# Patient Record
Sex: FEMALE | Race: WHITE | NOT HISPANIC OR LATINO | Employment: UNEMPLOYED | URBAN - METROPOLITAN AREA
[De-identification: names, ages, dates, MRNs, and addresses within clinical notes are randomized per-mention and may not be internally consistent; named-entity substitution may affect disease eponyms.]

---

## 2019-12-09 ENCOUNTER — TELEPHONE (OUTPATIENT)
Dept: SCHEDULING | Facility: IMAGING CENTER | Age: 54
End: 2019-12-09

## 2019-12-10 ENCOUNTER — OFFICE VISIT (OUTPATIENT)
Dept: MEDICAL GROUP | Facility: PHYSICIAN GROUP | Age: 54
End: 2019-12-10
Payer: COMMERCIAL

## 2019-12-10 VITALS
SYSTOLIC BLOOD PRESSURE: 132 MMHG | RESPIRATION RATE: 14 BRPM | BODY MASS INDEX: 26.5 KG/M2 | DIASTOLIC BLOOD PRESSURE: 82 MMHG | TEMPERATURE: 98.7 F | HEIGHT: 62 IN | OXYGEN SATURATION: 98 % | WEIGHT: 144 LBS | HEART RATE: 82 BPM

## 2019-12-10 DIAGNOSIS — M54.40 CHRONIC LOW BACK PAIN WITH SCIATICA, SCIATICA LATERALITY UNSPECIFIED, UNSPECIFIED BACK PAIN LATERALITY: ICD-10-CM

## 2019-12-10 DIAGNOSIS — F41.9 ANXIETY: ICD-10-CM
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
## 2019-12-10 DIAGNOSIS — F33.0 MDD (MAJOR DEPRESSIVE DISORDER), RECURRENT EPISODE, MILD (HCC): ICD-10-CM

## 2019-12-10 DIAGNOSIS — Z87.2 HISTORY OF CELLULITIS: ICD-10-CM

## 2019-12-10 DIAGNOSIS — Z09 HOSPITAL DISCHARGE FOLLOW-UP: ICD-10-CM

## 2019-12-10 DIAGNOSIS — G89.29 CHRONIC LOW BACK PAIN WITH SCIATICA, SCIATICA LATERALITY UNSPECIFIED, UNSPECIFIED BACK PAIN LATERALITY: ICD-10-CM

## 2019-12-10 PROCEDURE — 99358 PROLONG SERVICE W/O CONTACT: CPT | Performed by: FAMILY MEDICINE

## 2019-12-10 PROCEDURE — 99204 OFFICE O/P NEW MOD 45 MIN: CPT | Mod: 25 | Performed by: FAMILY MEDICINE

## 2019-12-10 RX ORDER — TRAMADOL HYDROCHLORIDE 50 MG/1
50 TABLET ORAL EVERY 8 HOURS PRN
COMMUNITY

## 2019-12-10 RX ORDER — CLONAZEPAM 1 MG/1
0.5 TABLET ORAL 2 TIMES DAILY
Qty: 60 TAB | Refills: 0
Start: 2019-12-10 | End: 2020-01-09

## 2019-12-10 RX ORDER — HYDROCODONE BITARTRATE AND ACETAMINOPHEN 5; 325 MG/1; MG/1
1-2 TABLET ORAL EVERY 4 HOURS PRN
COMMUNITY

## 2019-12-10 RX ORDER — SERTRALINE HYDROCHLORIDE 100 MG/1
200 TABLET, FILM COATED ORAL DAILY
COMMUNITY

## 2019-12-10 RX ORDER — CLINDAMYCIN HYDROCHLORIDE 300 MG/1
600 CAPSULE ORAL 3 TIMES DAILY
Qty: 42 CAP | Refills: 0 | Status: SHIPPED | OUTPATIENT
Start: 2019-12-10

## 2019-12-10 RX ORDER — SIMVASTATIN 80 MG
80 TABLET ORAL NIGHTLY
COMMUNITY

## 2019-12-10 RX ORDER — CYCLOBENZAPRINE HCL 10 MG
10 TABLET ORAL 2 TIMES DAILY
COMMUNITY

## 2019-12-10 RX ORDER — CLINDAMYCIN HYDROCHLORIDE 300 MG/1
300 CAPSULE ORAL 3 TIMES DAILY
COMMUNITY
End: 2019-12-10 | Stop reason: SDUPTHER

## 2019-12-10 ASSESSMENT — PATIENT HEALTH QUESTIONNAIRE - PHQ9: CLINICAL INTERPRETATION OF PHQ2 SCORE: 0

## 2019-12-11 PROBLEM — F41.9 ANXIETY: Status: ACTIVE | Noted: 2019-12-11

## 2019-12-11 PROBLEM — G89.29 CHRONIC LOW BACK PAIN WITH SCIATICA: Status: ACTIVE | Noted: 2019-12-11

## 2019-12-11 PROBLEM — F33.0 MDD (MAJOR DEPRESSIVE DISORDER), RECURRENT EPISODE, MILD (HCC): Status: ACTIVE | Noted: 2019-12-11

## 2019-12-11 PROBLEM — M54.40 CHRONIC LOW BACK PAIN WITH SCIATICA: Status: ACTIVE | Noted: 2019-12-11

## 2019-12-11 ASSESSMENT — ENCOUNTER SYMPTOMS
DOUBLE VISION: 0
DEPRESSION: 1
CHILLS: 0
NAUSEA: 0
PALPITATIONS: 0
HEADACHES: 0
CONSTITUTIONAL NEGATIVE: 1
DIZZINESS: 0
GASTROINTESTINAL NEGATIVE: 1
MYALGIAS: 0
RESPIRATORY NEGATIVE: 1
HEARTBURN: 0
HEMOPTYSIS: 0
EYES NEGATIVE: 1
NEUROLOGICAL NEGATIVE: 1
BRUISES/BLEEDS EASILY: 0
COUGH: 0
BACK PAIN: 1
BLURRED VISION: 0
CARDIOVASCULAR NEGATIVE: 1
NERVOUS/ANXIOUS: 1
TINGLING: 0
FEVER: 0

## 2019-12-11 NOTE — PROGRESS NOTES
Subjective:      Tracy Muñoz is a 53 y.o. female who presents with Hospital Follow-up (x 1 week / Diabetes / RT hand Cat bite / est. / swollen / redness)            In addition to the time for this e/m visit, another 35 minutes were spent in case management either reviewing old records or arranging for this patient's care and so an additional 87970 code is being used  The time spent were from 1800 to 1840      1. Hospital discharge follow-up  Patient visiting her sister here in [place], she lives in [place]   Was bitten by a cat and not improved on po amoxil at home and went to [facility], had iv abx for 3 days with improvement of redness and swelling in left hand, sent home on po cleocin and has continued to improve but still with some redness and swelling in the dorsum, she had an elevation in her wbc in [facility] that declined to normal at time of d/c  Will give another round of cleocin as redness not fully resolved and f/u here in 2 weeks or sooner if not improved    2. History of cellulitis    - clindamycin (CLEOCIN) 300 MG Cap; Take 2 Caps by mouth 3 times a day.  Dispense: 42 Cap; Refill: 0    3. Cat bite, sequela    - clindamycin (CLEOCIN) 300 MG Cap; Take 2 Caps by mouth 3 times a day.  Dispense: 42 Cap; Refill: 0    4. Anxiety  Not rx by me, The patient's current medical issue is well controlled on the current therapy with no new symptoms or worsening    - clonazePAM (KLONOPIN) 1 MG Tab; Take 0.5 Tabs by mouth 2 times a day for 30 days.  Dispense: 60 Tab; Refill: 0    5. MDD (major depressive disorder), recurrent episode, mild (HCC)  The patient's current medical issue is well controlled on the current therapy with no new symptoms or worsening      6. Diabetes mellitus, insulin dependent (IDDM), controlled (HCC)  On insulin regular and levamir, will continue    7. Chronic low back pain with sciatica, sciatica laterality unspecified, unspecified back pain laterality  Per her pain specialist    Past Medical  History:  No date: Depression  No date: Diabetes (HCC)  No date: Hyperlipidemia  Past Surgical History:  No date: ABDOMINAL HYSTERECTOMY TOTAL  No date: CHOLECYSTECTOMY  No date: LAMINOTOMY      Comment:  spinal fusion  Social History    Tobacco Use      Smoking status: Former Smoker        Packs/day: 0.00      Smokeless tobacco: Current User      Tobacco comment: vape    Alcohol use: Yes    Drug use: Not Currently    Review of patient's family history indicates:  Problem: Diabetes      Relation: Sister          Age of Onset: (Not Specified)      Current Outpatient Medications: •  tramadol (ULTRAM) 50 MG Tab, Take 50 mg by mouth every four hours as needed., Disp: , Rfl: •  sertraline (ZOLOFT) 100 MG Tab, Take 100 mg by mouth every day., Disp: , Rfl:  •  cyclobenzaprine (FLEXERIL) 10 MG Tab, Take 10 mg by mouth 3 times a day as needed., Disp: , Rfl: •  simvastatin (ZOCOR) 80 MG tablet, Take 80 mg by mouth every evening., Disp: , Rfl: •  HYDROcodone-acetaminophen (NORCO) 5-325 MG Tab per tablet, Take 1-2 Tabs by mouth every four hours as needed., Disp: , Rfl: •  Fluticasone Furoate 50 MCG/ACT AEROSOL POWDER, BREATH ACTIVATED, Inhale  by mouth., Disp: , Rfl: •  Procaine HCl (NOVOCAIN INJ), by Injection route., Disp: , Rfl: •  clonazePAM (KLONOPIN) 1 MG Tab, Take 0.5 Tabs by mouth 2 times a day for 30 days., Disp: 60 Tab, Rfl: 0•  clindamycin (CLEOCIN) 300 MG Cap, Take 2 Caps by mouth 3 times a day., Disp: 42 Cap, Rfl: 0    Patient was instructed on the use of medications, either prescriptions or OTC and informed on when the appropriate follow up time period should be. In addition, patient was also instructed that should any acute worsening occur that they should notify this clinic asap or call 911.            Review of Systems   Constitutional: Negative.  Negative for chills and fever.   HENT: Negative.  Negative for hearing loss.    Eyes: Negative.  Negative for blurred vision and double vision.   Respiratory:  "Negative.  Negative for cough and hemoptysis.    Cardiovascular: Negative.  Negative for chest pain and palpitations.   Gastrointestinal: Negative.  Negative for heartburn and nausea.   Genitourinary: Negative.  Negative for dysuria.   Musculoskeletal: Positive for back pain. Negative for myalgias.   Skin: Positive for itching and rash.   Neurological: Negative.  Negative for dizziness, tingling and headaches.   Endo/Heme/Allergies: Negative.  Does not bruise/bleed easily.   Psychiatric/Behavioral: Positive for depression. Negative for suicidal ideas. The patient is nervous/anxious.    All other systems reviewed and are negative.         Objective:     /82   Pulse 82   Temp 37.1 °C (98.7 °F) (Temporal)   Resp 14   Ht 1.575 m (5' 2\")   Wt 65.3 kg (144 lb)   SpO2 98%   BMI 26.34 kg/m²      Physical Exam  Vitals signs and nursing note reviewed.   Constitutional:       General: She is not in acute distress.     Appearance: She is well-developed. She is not diaphoretic.   HENT:      Head: Normocephalic and atraumatic.      Mouth/Throat:      Pharynx: No oropharyngeal exudate.   Eyes:      Pupils: Pupils are equal, round, and reactive to light.   Cardiovascular:      Rate and Rhythm: Normal rate and regular rhythm.      Heart sounds: Normal heart sounds. No murmur. No friction rub. No gallop.    Pulmonary:      Effort: Pulmonary effort is normal. No respiratory distress.      Breath sounds: Normal breath sounds. No wheezing or rales.   Chest:      Chest wall: No tenderness.   Musculoskeletal:      Left hand: She exhibits decreased range of motion, tenderness and swelling.        Hands:    Neurological:      Mental Status: She is alert and oriented to person, place, and time.   Psychiatric:         Behavior: Behavior normal.         Thought Content: Thought content normal.         Judgment: Judgment normal.                 Assessment/Plan:       1. Hospital discharge follow-up      2. History of " cellulitis    - clindamycin (CLEOCIN) 300 MG Cap; Take 2 Caps by mouth 3 times a day.  Dispense: 42 Cap; Refill: 0    3. Cat bite, sequela    - clindamycin (CLEOCIN) 300 MG Cap; Take 2 Caps by mouth 3 times a day.  Dispense: 42 Cap; Refill: 0    4. Anxiety    - clonazePAM (KLONOPIN) 1 MG Tab; Take 0.5 Tabs by mouth 2 times a day for 30 days.  Dispense: 60 Tab; Refill: 0    5. MDD (major depressive disorder), recurrent episode, mild (HCC)      6. Diabetes mellitus, insulin dependent (IDDM), controlled (HCC)      7. Chronic low back pain with sciatica, sciatica laterality unspecified, unspecified back pain laterality

## 2019-12-24 ENCOUNTER — OFFICE VISIT (OUTPATIENT)
Dept: MEDICAL GROUP | Facility: PHYSICIAN GROUP | Age: 54
End: 2019-12-24
Payer: COMMERCIAL

## 2019-12-24 VITALS
TEMPERATURE: 97.6 F | BODY MASS INDEX: 27.23 KG/M2 | RESPIRATION RATE: 16 BRPM | HEART RATE: 99 BPM | OXYGEN SATURATION: 90 % | DIASTOLIC BLOOD PRESSURE: 68 MMHG | WEIGHT: 148 LBS | HEIGHT: 62 IN | SYSTOLIC BLOOD PRESSURE: 92 MMHG

## 2019-12-24 DIAGNOSIS — Z87.2 HISTORY OF CELLULITIS: ICD-10-CM

## 2019-12-24 DIAGNOSIS — G89.29 CHRONIC LOW BACK PAIN WITH SCIATICA, SCIATICA LATERALITY UNSPECIFIED, UNSPECIFIED BACK PAIN LATERALITY: ICD-10-CM
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
## 2019-12-24 DIAGNOSIS — M54.40 CHRONIC LOW BACK PAIN WITH SCIATICA, SCIATICA LATERALITY UNSPECIFIED, UNSPECIFIED BACK PAIN LATERALITY: ICD-10-CM

## 2019-12-24 PROCEDURE — 99214 OFFICE O/P EST MOD 30 MIN: CPT | Performed by: FAMILY MEDICINE

## 2019-12-24 ASSESSMENT — ENCOUNTER SYMPTOMS
DOUBLE VISION: 0
CONSTITUTIONAL NEGATIVE: 1
DIZZINESS: 0
HEADACHES: 0
GASTROINTESTINAL NEGATIVE: 1
PSYCHIATRIC NEGATIVE: 1
BLURRED VISION: 0
BRUISES/BLEEDS EASILY: 0
COUGH: 0
DEPRESSION: 0
NAUSEA: 0
CARDIOVASCULAR NEGATIVE: 1
HEARTBURN: 0
MYALGIAS: 0
CHILLS: 0
TINGLING: 0
EYES NEGATIVE: 1
HEMOPTYSIS: 0
NEUROLOGICAL NEGATIVE: 1
PALPITATIONS: 0
FEVER: 0
RESPIRATORY NEGATIVE: 1

## 2019-12-24 NOTE — PROGRESS NOTES
Subjective:      Tracy Muñoz is a 53 y.o. female who presents with Follow-Up (cat bite)            Erythema in the left wrist from cat bite now resolved. Still with some mild swelling in the wrist and MCP and resultant decrease in ROM, advised on exercises and if when she gets back to [place] next week still has this advised to get some PT    1. History of cellulitis      2. Cat bite, sequela      3. Diabetes mellitus, insulin dependent (IDDM), controlled (HCC)  Currently treated for DM, taking meds and checking bs at home, trying to do DM diet.controlled      4. Chronic low back pain with sciatica, sciatica laterality unspecified, unspecified back pain laterality  The patient's current medical issue is well controlled on the current therapy with no new symptoms or worsening      Past Medical History:  No date: Depression  No date: Diabetes (HCC)  No date: Hyperlipidemia  Past Surgical History:  No date: ABDOMINAL HYSTERECTOMY TOTAL  No date: CHOLECYSTECTOMY  No date: LAMINOTOMY      Comment:  spinal fusion  Social History    Tobacco Use      Smoking status: Former Smoker        Packs/day: 0.00      Smokeless tobacco: Current User      Tobacco comment: vape    Alcohol use: Yes    Drug use: Not Currently    Review of patient's family history indicates:  Problem: Diabetes      Relation: Sister          Age of Onset: (Not Specified)      Current Outpatient Medications: •  tramadol (ULTRAM) 50 MG Tab, Take 50 mg by mouth every four hours as needed., Disp: , Rfl: •  sertraline (ZOLOFT) 100 MG Tab, Take 100 mg by mouth every day., Disp: , Rfl:  •  cyclobenzaprine (FLEXERIL) 10 MG Tab, Take 10 mg by mouth 3 times a day as needed., Disp: , Rfl: •  simvastatin (ZOCOR) 80 MG tablet, Take 80 mg by mouth every evening., Disp: , Rfl: •  HYDROcodone-acetaminophen (NORCO) 5-325 MG Tab per tablet, Take 1-2 Tabs by mouth every four hours as needed., Disp: , Rfl: •  Fluticasone Furoate 50 MCG/ACT AEROSOL POWDER, BREATH  "ACTIVATED, Inhale  by mouth., Disp: , Rfl: •  Procaine HCl (NOVOCAIN INJ), by Injection route., Disp: , Rfl: •  clonazePAM (KLONOPIN) 1 MG Tab, Take 0.5 Tabs by mouth 2 times a day for 30 days., Disp: 60 Tab, Rfl: 0•  clindamycin (CLEOCIN) 300 MG Cap, Take 2 Caps by mouth 3 times a day., Disp: 42 Cap, Rfl: 0    Patient was instructed on the use of medications, either prescriptions or OTC and informed on when the appropriate follow up time period should be. In addition, patient was also instructed that should any acute worsening occur that they should notify this clinic asap or call 911.          Review of Systems   Constitutional: Negative.  Negative for chills and fever.   HENT: Negative.  Negative for hearing loss.    Eyes: Negative.  Negative for blurred vision and double vision.   Respiratory: Negative.  Negative for cough and hemoptysis.    Cardiovascular: Negative.  Negative for chest pain and palpitations.   Gastrointestinal: Negative.  Negative for heartburn and nausea.   Genitourinary: Negative.  Negative for dysuria.   Musculoskeletal: Positive for joint pain. Negative for myalgias.   Skin: Positive for itching and rash.   Neurological: Negative.  Negative for dizziness, tingling and headaches.   Endo/Heme/Allergies: Negative.  Does not bruise/bleed easily.   Psychiatric/Behavioral: Negative.  Negative for depression and suicidal ideas.   All other systems reviewed and are negative.         Objective:     BP (!) 92/68 (BP Location: Left arm, Patient Position: Sitting, BP Cuff Size: Adult)   Pulse 99   Temp 36.4 °C (97.6 °F) (Temporal)   Resp 16   Ht 1.575 m (5' 2\")   Wt 67.1 kg (148 lb)   SpO2 90%   BMI 27.07 kg/m²      Physical Exam  Vitals signs and nursing note reviewed.   Constitutional:       General: She is not in acute distress.     Appearance: She is well-developed. She is not diaphoretic.   HENT:      Head: Normocephalic and atraumatic.      Mouth/Throat:      Pharynx: No oropharyngeal " exudate.   Eyes:      Pupils: Pupils are equal, round, and reactive to light.   Cardiovascular:      Rate and Rhythm: Normal rate and regular rhythm.      Heart sounds: Normal heart sounds. No murmur. No friction rub. No gallop.    Pulmonary:      Effort: Pulmonary effort is normal. No respiratory distress.      Breath sounds: Normal breath sounds. No wheezing or rales.   Chest:      Chest wall: No tenderness.   Musculoskeletal:      Left hand: She exhibits decreased range of motion and swelling. She exhibits no tenderness, no bony tenderness, normal two-point discrimination, normal capillary refill, no deformity and no laceration. Normal sensation noted. Normal strength noted.        Hands:    Neurological:      Mental Status: She is alert and oriented to person, place, and time.   Psychiatric:         Behavior: Behavior normal.         Thought Content: Thought content normal.         Judgment: Judgment normal.                 Assessment/Plan:       1. History of cellulitis      2. Cat bite, sequela      3. Diabetes mellitus, insulin dependent (IDDM), controlled (HCC)      4. Chronic low back pain with sciatica, sciatica laterality unspecified, unspecified back pain laterality

## 2020-02-03 ENCOUNTER — OFFICE VISIT (OUTPATIENT)
Dept: MEDICAL GROUP | Facility: PHYSICIAN GROUP | Age: 55
End: 2020-02-03
Payer: COMMERCIAL

## 2020-02-03 VITALS
BODY MASS INDEX: 27.64 KG/M2 | HEIGHT: 62 IN | TEMPERATURE: 97 F | RESPIRATION RATE: 20 BRPM | SYSTOLIC BLOOD PRESSURE: 90 MMHG | OXYGEN SATURATION: 96 % | HEART RATE: 80 BPM | DIASTOLIC BLOOD PRESSURE: 70 MMHG | WEIGHT: 150.2 LBS

## 2020-02-03 DIAGNOSIS — F11.90 OPIATE USE: ICD-10-CM

## 2020-02-03 DIAGNOSIS — G89.29 CHRONIC LOW BACK PAIN WITH SCIATICA, SCIATICA LATERALITY UNSPECIFIED, UNSPECIFIED BACK PAIN LATERALITY: ICD-10-CM

## 2020-02-03 DIAGNOSIS — F41.9 ANXIETY: ICD-10-CM

## 2020-02-03 DIAGNOSIS — M54.40 CHRONIC LOW BACK PAIN WITH SCIATICA, SCIATICA LATERALITY UNSPECIFIED, UNSPECIFIED BACK PAIN LATERALITY: ICD-10-CM

## 2020-02-03 PROCEDURE — 99214 OFFICE O/P EST MOD 30 MIN: CPT | Performed by: FAMILY MEDICINE

## 2020-02-03 RX ORDER — CLONAZEPAM 1 MG/1
1 TABLET ORAL 3 TIMES DAILY
COMMUNITY

## 2020-02-03 RX ORDER — IBUPROFEN 400 MG/1
400 TABLET ORAL 2 TIMES DAILY
COMMUNITY

## 2020-02-03 RX ORDER — GABAPENTIN 300 MG/1
300 CAPSULE ORAL 3 TIMES DAILY
Qty: 90 CAP | Refills: 1 | Status: SHIPPED | OUTPATIENT
Start: 2020-02-03

## 2020-02-03 RX ORDER — LISINOPRIL 5 MG/1
5 TABLET ORAL DAILY
COMMUNITY

## 2020-02-03 ASSESSMENT — ENCOUNTER SYMPTOMS
PALPITATIONS: 0
PSYCHIATRIC NEGATIVE: 1
EYES NEGATIVE: 1
GASTROINTESTINAL NEGATIVE: 1
HEMOPTYSIS: 0
DEPRESSION: 0
NAUSEA: 0
CHILLS: 0
DOUBLE VISION: 0
HEADACHES: 0
DIZZINESS: 0
TINGLING: 0
HEARTBURN: 0
BACK PAIN: 1
RESPIRATORY NEGATIVE: 1
CONSTITUTIONAL NEGATIVE: 1
FEVER: 0
NEUROLOGICAL NEGATIVE: 1
BRUISES/BLEEDS EASILY: 0
COUGH: 0
CARDIOVASCULAR NEGATIVE: 1
MYALGIAS: 0
BLURRED VISION: 0

## 2020-02-03 NOTE — PROGRESS NOTES
Subjective:      Tracy Muñoz is a 54 y.o. female who presents with Medication Refill            1. Chronic low back pain with sciatica, sciatica laterality unspecified, unspecified back pain laterality  Moved here from [place]  On both ultram and klonopin   Will need to get these from pain clinic  - REFERRAL TO PAIN CLINIC  - gabapentin (NEURONTIN) 300 MG Cap; Take 1 Cap by mouth 3 times a day.  Dispense: 90 Cap; Refill: 1    2. Diabetes mellitus, insulin dependent (IDDM), controlled (HCC)  Currently treated for DM, taking meds and checking bs at home, trying to do DM diet.controlled  Needs new labs   levemir refilled  - HEMOGLOBIN A1C; Future  - Lipid Profile; Future  - MICROALBUMIN 24 HR URINE; Future  - Comp Metabolic Panel; Future  - CBC WITHOUT DIFFERENTIAL; Future    3. Anxiety      4. Opiate use    - REFERRAL TO PAIN CLINIC    Past Medical History:  No date: Depression  No date: Diabetes (HCC)  No date: Hyperlipidemia  Past Surgical History:  No date: ABDOMINAL HYSTERECTOMY TOTAL  No date: CHOLECYSTECTOMY  No date: LAMINOTOMY      Comment:  spinal fusion  Social History    Tobacco Use      Smoking status: Former Smoker        Packs/day: 0.00      Smokeless tobacco: Current User      Tobacco comment: vape    Alcohol use: Yes    Drug use: Not Currently    Review of patient's family history indicates:  Problem: Diabetes      Relation: Sister          Age of Onset: (Not Specified)      Current Outpatient Medications: •  Cholecalciferol 2000 UNIT Cap, Take 400 Units by mouth every day., Disp: , Rfl: •  clonazePAM (KLONOPIN) 1 MG Tab, Take 1 mg by mouth 3 times a day., Disp: , Rfl: •  ibuprofen (MOTRIN) 400 MG Tab, Take 400 mg by mouth 2 Times a Day., Disp: , Rfl: •  NOVOLOG 100 UNIT/ML Solution, Inject  as instructed 3 times a day before meals. SLIDING SCALE TO LEVEMIR, MAX OF 20 UNITS DAILY, Disp: , Rfl: •  Insulin Detemir (LEVEMIR FLEXPEN SC), Inject 50 Units as instructed 2 Times a Day., Disp: , Rfl: •   lisinopril (PRINIVIL) 5 MG Tab, Take 5 mg by mouth every day., Disp: , Rfl: •  Insulin Pen Needle, 1 Units by Does not apply route 3 times a day., Disp: , Rfl: •  insulin detemir (LEVEMIR FLEXTOUCH) 100 UNIT/ML injection PEN, Inject 50 Units as instructed 2 times a day., Disp: 1 PEN, Rfl: 5•  gabapentin (NEURONTIN) 300 MG Cap, Take 1 Cap by mouth 3 times a day., Disp: 90 Cap, Rfl: 1•  tramadol (ULTRAM) 50 MG Tab, Take 50 mg by mouth every 8 hours as needed., Disp: , Rfl: •  sertraline (ZOLOFT) 100 MG Tab, Take 200 mg by mouth every day., Disp: , Rfl:  •  cyclobenzaprine (FLEXERIL) 10 MG Tab, Take 10 mg by mouth 2 Times a Day., Disp: , Rfl: •  simvastatin (ZOCOR) 80 MG tablet, Take 80 mg by mouth every evening., Disp: , Rfl: •  Fluticasone Furoate 50 MCG/ACT AEROSOL POWDER, BREATH ACTIVATED, Inhale 2 Sprays by mouth every day., Disp: , Rfl: •  HYDROcodone-acetaminophen (NORCO) 5-325 MG Tab per tablet, Take 1-2 Tabs by mouth every four hours as needed., Disp: , Rfl: •  Procaine HCl (NOVOCAIN INJ), by Injection route., Disp: , Rfl: •  clindamycin (CLEOCIN) 300 MG Cap, Take 2 Caps by mouth 3 times a day., Disp: 42 Cap, Rfl: 0    Patient was instructed on the use of medications, either prescriptions or OTC and informed on when the appropriate follow up time period should be. In addition, patient was also instructed that should any acute worsening occur that they should notify this clinic asap or call 911.          Review of Systems   Constitutional: Negative.  Negative for chills and fever.   HENT: Negative.  Negative for hearing loss.    Eyes: Negative.  Negative for blurred vision and double vision.   Respiratory: Negative.  Negative for cough and hemoptysis.    Cardiovascular: Negative.  Negative for chest pain and palpitations.   Gastrointestinal: Negative.  Negative for heartburn and nausea.   Genitourinary: Negative.  Negative for dysuria.   Musculoskeletal: Positive for back pain. Negative for myalgias.   Skin:  "Negative.  Negative for rash.   Neurological: Negative.  Negative for dizziness, tingling and headaches.   Endo/Heme/Allergies: Negative.  Does not bruise/bleed easily.   Psychiatric/Behavioral: Negative.  Negative for depression and suicidal ideas.   All other systems reviewed and are negative.         Objective:     BP (!) 90/70   Pulse 80   Temp 36.1 °C (97 °F) (Temporal)   Resp 20   Ht 1.575 m (5' 2\")   Wt 68.1 kg (150 lb 3.2 oz)   SpO2 96%   BMI 27.47 kg/m²      Physical Exam  Vitals signs and nursing note reviewed.   Constitutional:       General: She is not in acute distress.     Appearance: She is well-developed. She is not diaphoretic.   HENT:      Head: Normocephalic and atraumatic.      Mouth/Throat:      Pharynx: No oropharyngeal exudate.   Eyes:      Pupils: Pupils are equal, round, and reactive to light.   Cardiovascular:      Rate and Rhythm: Normal rate and regular rhythm.      Heart sounds: Normal heart sounds. No murmur. No friction rub. No gallop.    Pulmonary:      Effort: Pulmonary effort is normal. No respiratory distress.      Breath sounds: Normal breath sounds. No wheezing or rales.   Chest:      Chest wall: No tenderness.   Neurological:      Mental Status: She is alert and oriented to person, place, and time.   Psychiatric:         Behavior: Behavior normal.         Thought Content: Thought content normal.         Judgment: Judgment normal.                 Assessment/Plan:       1. Chronic low back pain with sciatica, sciatica laterality unspecified, unspecified back pain laterality    - REFERRAL TO PAIN CLINIC  - gabapentin (NEURONTIN) 300 MG Cap; Take 1 Cap by mouth 3 times a day.  Dispense: 90 Cap; Refill: 1    2. Diabetes mellitus, insulin dependent (IDDM), controlled (HCC)    - HEMOGLOBIN A1C; Future  - Lipid Profile; Future  - MICROALBUMIN 24 HR URINE; Future  - Comp Metabolic Panel; Future  - CBC WITHOUT DIFFERENTIAL; Future    3. Anxiety      4. Opiate use    - REFERRAL TO " PAIN CLINIC